# Patient Record
Sex: FEMALE | Race: WHITE | Employment: OTHER | URBAN - NONMETROPOLITAN AREA
[De-identification: names, ages, dates, MRNs, and addresses within clinical notes are randomized per-mention and may not be internally consistent; named-entity substitution may affect disease eponyms.]

---

## 2017-01-09 ENCOUNTER — PATIENT OUTREACH (OUTPATIENT)
Dept: FAMILY MEDICINE CLINIC | Facility: CLINIC | Age: 77
End: 2017-01-09

## 2017-01-10 RX ORDER — METOPROLOL TARTRATE 100 MG/1
TABLET ORAL
Qty: 90 TABLET | Refills: 0 | Status: SHIPPED | OUTPATIENT
Start: 2017-01-10 | End: 2017-02-28

## 2017-01-10 RX ORDER — POTASSIUM CHLORIDE 20 MEQ/1
TABLET, EXTENDED RELEASE ORAL
Qty: 120 TABLET | Refills: 0 | Status: SHIPPED | OUTPATIENT
Start: 2017-01-10 | End: 2017-05-11

## 2017-01-10 NOTE — TELEPHONE ENCOUNTER
Klor con last refilled on 9/20/16 for # 120 with 0 rf. Metoprolol last refilled on 7/13/16 for # 90 with 1 rf. Last labs 12/20/16. Last seen on 12/20/16. /78.   Future Appointments  Date Time Provider Nithin Metzger   3/21/2017 11:00 AM Holden Lozano,

## 2017-02-28 ENCOUNTER — OFFICE VISIT (OUTPATIENT)
Dept: FAMILY MEDICINE CLINIC | Facility: CLINIC | Age: 77
End: 2017-02-28

## 2017-02-28 VITALS
HEART RATE: 86 BPM | TEMPERATURE: 98 F | HEIGHT: 63 IN | SYSTOLIC BLOOD PRESSURE: 146 MMHG | BODY MASS INDEX: 38.8 KG/M2 | WEIGHT: 219 LBS | DIASTOLIC BLOOD PRESSURE: 78 MMHG | OXYGEN SATURATION: 96 %

## 2017-02-28 DIAGNOSIS — E03.9 ACQUIRED HYPOTHYROIDISM: ICD-10-CM

## 2017-02-28 DIAGNOSIS — N39.41 URGE INCONTINENCE OF URINE: ICD-10-CM

## 2017-02-28 DIAGNOSIS — R73.02 GLUCOSE INTOLERANCE (IMPAIRED GLUCOSE TOLERANCE): ICD-10-CM

## 2017-02-28 DIAGNOSIS — I10 ESSENTIAL HYPERTENSION: Primary | ICD-10-CM

## 2017-02-28 LAB
ALBUMIN SERPL-MCNC: 3.6 G/DL (ref 3.5–4.8)
ALP LIVER SERPL-CCNC: 92 U/L (ref 55–142)
ALT SERPL-CCNC: 40 U/L (ref 14–54)
AST SERPL-CCNC: 45 U/L (ref 15–41)
BILIRUB SERPL-MCNC: 0.7 MG/DL (ref 0.1–2)
BUN BLD-MCNC: 33 MG/DL (ref 8–20)
CALCIUM BLD-MCNC: 9.6 MG/DL (ref 8.3–10.3)
CHLORIDE: 104 MMOL/L (ref 101–111)
CO2: 28 MMOL/L (ref 22–32)
CREAT BLD-MCNC: 1.26 MG/DL (ref 0.55–1.02)
EST. AVERAGE GLUCOSE BLD GHB EST-MCNC: 123 MG/DL (ref 68–126)
GLUCOSE BLD-MCNC: 115 MG/DL (ref 70–99)
HBA1C MFR BLD HPLC: 5.9 % (ref ?–5.7)
M PROTEIN MFR SERPL ELPH: 7.5 G/DL (ref 6.1–8.3)
POTASSIUM SERPL-SCNC: 4.3 MMOL/L (ref 3.6–5.1)
SODIUM SERPL-SCNC: 140 MMOL/L (ref 136–144)
TSI SER-ACNC: 9.12 MIU/ML (ref 0.35–5.5)

## 2017-02-28 PROCEDURE — 99214 OFFICE O/P EST MOD 30 MIN: CPT | Performed by: INTERNAL MEDICINE

## 2017-02-28 PROCEDURE — 84443 ASSAY THYROID STIM HORMONE: CPT | Performed by: INTERNAL MEDICINE

## 2017-02-28 PROCEDURE — 83036 HEMOGLOBIN GLYCOSYLATED A1C: CPT | Performed by: INTERNAL MEDICINE

## 2017-02-28 PROCEDURE — 80053 COMPREHEN METABOLIC PANEL: CPT | Performed by: INTERNAL MEDICINE

## 2017-02-28 RX ORDER — METOPROLOL TARTRATE 100 MG/1
100 TABLET ORAL 2 TIMES DAILY
Qty: 180 TABLET | Refills: 0 | Status: SHIPPED | OUTPATIENT
Start: 2017-02-28 | End: 2017-08-14

## 2017-02-28 NOTE — PROGRESS NOTES
Francisco Javier Vega is a 64year old female. HPI:   Pt has been well, she has no falls and she has been caring for her legs and there is no issue now.   She has to take diuretics to keep leg swelling down, but she had trouble with incontinence of urine and sometime unspecified whether generalized or localized, unspecified site    • Hypothyroid       Social History:    Smoking Status: Never Smoker                      Comment: Non-smoker    Alcohol Use: Yes                Comment: <1 drink/day  Patient was not advised

## 2017-03-01 ENCOUNTER — TELEPHONE (OUTPATIENT)
Dept: FAMILY MEDICINE CLINIC | Facility: CLINIC | Age: 77
End: 2017-03-01

## 2017-04-13 ENCOUNTER — TELEPHONE (OUTPATIENT)
Dept: FAMILY MEDICINE CLINIC | Facility: CLINIC | Age: 77
End: 2017-04-13

## 2017-04-15 ENCOUNTER — MED REC SCAN ONLY (OUTPATIENT)
Dept: FAMILY MEDICINE CLINIC | Facility: CLINIC | Age: 77
End: 2017-04-15

## 2017-04-19 ENCOUNTER — OFFICE VISIT (OUTPATIENT)
Dept: FAMILY MEDICINE CLINIC | Facility: CLINIC | Age: 77
End: 2017-04-19

## 2017-04-19 ENCOUNTER — LAB ENCOUNTER (OUTPATIENT)
Dept: LAB | Age: 77
End: 2017-04-19
Attending: INTERNAL MEDICINE
Payer: MEDICARE

## 2017-04-19 VITALS
BODY MASS INDEX: 39.98 KG/M2 | HEIGHT: 61.5 IN | WEIGHT: 214.5 LBS | SYSTOLIC BLOOD PRESSURE: 130 MMHG | RESPIRATION RATE: 16 BRPM | HEART RATE: 68 BPM | DIASTOLIC BLOOD PRESSURE: 70 MMHG | TEMPERATURE: 98 F

## 2017-04-19 DIAGNOSIS — E03.9 ACQUIRED HYPOTHYROIDISM: ICD-10-CM

## 2017-04-19 DIAGNOSIS — N39.3 STRESS INCONTINENCE OF URINE: Primary | ICD-10-CM

## 2017-04-19 DIAGNOSIS — R63.4 WEIGHT LOSS: ICD-10-CM

## 2017-04-19 DIAGNOSIS — R73.02 GLUCOSE INTOLERANCE (IMPAIRED GLUCOSE TOLERANCE): ICD-10-CM

## 2017-04-19 DIAGNOSIS — R35.0 FREQUENCY OF URINATION: ICD-10-CM

## 2017-04-19 DIAGNOSIS — I10 ESSENTIAL HYPERTENSION: ICD-10-CM

## 2017-04-19 PROCEDURE — 36415 COLL VENOUS BLD VENIPUNCTURE: CPT

## 2017-04-19 PROCEDURE — 85025 COMPLETE CBC W/AUTO DIFF WBC: CPT

## 2017-04-19 PROCEDURE — 80053 COMPREHEN METABOLIC PANEL: CPT

## 2017-04-19 PROCEDURE — 84443 ASSAY THYROID STIM HORMONE: CPT

## 2017-04-19 PROCEDURE — 99214 OFFICE O/P EST MOD 30 MIN: CPT | Performed by: INTERNAL MEDICINE

## 2017-04-19 RX ORDER — TOLTERODINE 4 MG/1
4 CAPSULE, EXTENDED RELEASE ORAL DAILY
Qty: 30 CAPSULE | Refills: 0 | Status: SHIPPED | OUTPATIENT
Start: 2017-04-19 | End: 2017-08-14

## 2017-04-19 NOTE — PROGRESS NOTES
Cathy Mccauley is a 76year old female. HPI:   Pt has been having incontinence and using 3 garments a day. She has no urinary retention and would like to try medications again. She has been losing weight, but she says intentionally.      Current Outpatient P exertion  GI: denies abdominal pain and denies heartburn  NEURO: denies headaches    EXAM:   /70 mmHg  Pulse 68  Temp(Src) 98.1 °F (36.7 °C) (Temporal)  Resp 16  Ht 61.5\"  Wt 214 lb 8 oz  BMI 39.88 kg/m2  GENERAL: well developed, well nourished,in n

## 2017-05-11 RX ORDER — POTASSIUM CHLORIDE 20 MEQ/1
TABLET, EXTENDED RELEASE ORAL
Qty: 120 TABLET | Refills: 0 | Status: SHIPPED | OUTPATIENT
Start: 2017-05-11

## 2017-05-26 RX ORDER — LEVOTHYROXINE SODIUM 0.15 MG/1
TABLET ORAL
Qty: 90 TABLET | Refills: 1 | Status: SHIPPED | OUTPATIENT
Start: 2017-05-26

## 2017-06-26 ENCOUNTER — TELEPHONE (OUTPATIENT)
Dept: FAMILY MEDICINE CLINIC | Facility: CLINIC | Age: 77
End: 2017-06-26

## 2017-06-26 NOTE — TELEPHONE ENCOUNTER
Nayely Donahue advised. She said she will be PT only since she is at [facility]. , she was discharged from the hospital today with A-fib and hypomagnesemia, she was admitted with palpitations.

## 2017-06-26 NOTE — TELEPHONE ENCOUNTER
PATIENT IS DISCHARGING TODAY FROM [facility] NEEDS TO VERIFY DR HUDSON IS PAP AND WILL FOLLOWING AND SIGNING Asia Lamb

## 2017-06-27 ENCOUNTER — TELEPHONE (OUTPATIENT)
Dept: FAMILY MEDICINE CLINIC | Facility: CLINIC | Age: 77
End: 2017-06-27

## 2017-06-27 NOTE — TELEPHONE ENCOUNTER
FAXED [facility] PAPERWORK ABOUT MED CHANGES SINCE PT WAS [place], DID WE RECEIVE THIS STUFF?

## 2017-06-28 ENCOUNTER — TELEPHONE (OUTPATIENT)
Dept: FAMILY MEDICINE CLINIC | Facility: CLINIC | Age: 77
End: 2017-06-28

## 2017-06-29 ENCOUNTER — OFFICE VISIT (OUTPATIENT)
Dept: FAMILY MEDICINE CLINIC | Facility: CLINIC | Age: 77
End: 2017-06-29

## 2017-06-29 ENCOUNTER — MED REC SCAN ONLY (OUTPATIENT)
Dept: FAMILY MEDICINE CLINIC | Facility: CLINIC | Age: 77
End: 2017-06-29

## 2017-06-29 VITALS
RESPIRATION RATE: 18 BRPM | DIASTOLIC BLOOD PRESSURE: 70 MMHG | WEIGHT: 207 LBS | BODY MASS INDEX: 38 KG/M2 | SYSTOLIC BLOOD PRESSURE: 118 MMHG | HEART RATE: 102 BPM | TEMPERATURE: 98 F

## 2017-06-29 DIAGNOSIS — N18.30 CKD (CHRONIC KIDNEY DISEASE) STAGE 3, GFR 30-59 ML/MIN (HCC): ICD-10-CM

## 2017-06-29 DIAGNOSIS — M15.8 OTHER OSTEOARTHRITIS INVOLVING MULTIPLE JOINTS: ICD-10-CM

## 2017-06-29 DIAGNOSIS — I50.9 CHF (NYHA CLASS III, ACC/AHA STAGE C) (HCC): ICD-10-CM

## 2017-06-29 DIAGNOSIS — R53.1 GENERALIZED WEAKNESS: ICD-10-CM

## 2017-06-29 DIAGNOSIS — Z00.00 ENCOUNTER FOR ANNUAL HEALTH EXAMINATION: Primary | ICD-10-CM

## 2017-06-29 DIAGNOSIS — I10 ESSENTIAL HYPERTENSION: ICD-10-CM

## 2017-06-29 DIAGNOSIS — R73.02 GLUCOSE INTOLERANCE (IMPAIRED GLUCOSE TOLERANCE): ICD-10-CM

## 2017-06-29 DIAGNOSIS — I48.20 CHRONIC ATRIAL FIBRILLATION (HCC): ICD-10-CM

## 2017-06-29 DIAGNOSIS — E03.9 ACQUIRED HYPOTHYROIDISM: ICD-10-CM

## 2017-06-29 PROCEDURE — G0447 BEHAVIOR COUNSEL OBESITY 15M: HCPCS | Performed by: INTERNAL MEDICINE

## 2017-06-29 PROCEDURE — G0439 PPPS, SUBSEQ VISIT: HCPCS | Performed by: INTERNAL MEDICINE

## 2017-06-29 PROCEDURE — 99214 OFFICE O/P EST MOD 30 MIN: CPT | Performed by: INTERNAL MEDICINE

## 2017-06-29 RX ORDER — CEPHALEXIN 500 MG/1
500 CAPSULE ORAL 4 TIMES DAILY
COMMUNITY
End: 2017-08-14 | Stop reason: ALTCHOICE

## 2017-06-29 RX ORDER — DIGOXIN 125 MCG
TABLET ORAL DAILY
COMMUNITY

## 2017-06-30 ENCOUNTER — TELEPHONE (OUTPATIENT)
Dept: FAMILY MEDICINE CLINIC | Facility: CLINIC | Age: 77
End: 2017-06-30

## 2017-06-30 PROBLEM — I50.9 CHF (NYHA CLASS III, ACC/AHA STAGE C) (HCC): Status: ACTIVE | Noted: 2017-06-30

## 2017-06-30 PROBLEM — I48.20 CHRONIC ATRIAL FIBRILLATION (HCC): Status: ACTIVE | Noted: 2017-06-30

## 2017-06-30 NOTE — PATIENT INSTRUCTIONS
Esperanzapatrizia Sepulveda's SCREENING SCHEDULE   Tests on this list are recommended by your physician but may not be covered, or covered at this frequency, by your insurer. Please check with your insurance carrier before scheduling to verify coverage.    PREVENTATIVE SE Limited to patients who meet one of the following criteria:   • Men who are 73-68 years old and have smoked more than 100 cigarettes in their lifetime   • Anyone with a family history    Colorectal Cancer Screening  Covered up to Age 76     Colonoscopy Scr Influenza  Covered Annually   Orders placed or performed in visit on 10/10/16  -FLU VAC NO PRSV 4 BRADY 3 YRS+   Orders placed or performed in visit on 10/15/15  -FLU VACC PRSV FREE INC ANTIG   Orders placed or performed in visit on 10/16/14  -FLU VACC PRSV information including definitions of the different types of Advance Directives. It also has the State forms available on it's website for anyone to review and print using their home computer and printer. (the forms are also available in [address])  [email or web address]

## 2017-06-30 NOTE — PROGRESS NOTES
HPI:   Agnela Stanley is a 76year old female who presents for a Medicare Subsequent Annual Wellness visit (Pt already had Initial Annual Wellness). Pt was just in the hospital with new onset AFIB and CHF for 4 days at [facility].  She is now on dig and e BEFORE BREAKFAST   KLOR-CON M20 20 MEQ Oral Tab CR TAKE ONE TABLET BY MOUTH TWICE DAILY   Tolterodine Tartrate ER 4 MG Oral Capsule SR 24 Hr Take 1 capsule (4 mg total) by mouth daily.    Metoprolol Tartrate 100 MG Oral Tab Take 1 tablet (100 mg total) by m denies hx of allergy or asthma    EXAM:   /70 (BP Location: Left arm, Patient Position: Sitting, Cuff Size: large)   Pulse 102   Temp 97.8 °F (36.6 °C) (Temporal)   Resp 18   Wt 207 lb   BMI 38.48 kg/m²  Estimated body mass index is 38.48 kg/m² as ca 10/26/2010, 10/17/2011, 10/25/2012, 10/09/2013   • Influenza Vaccine, High Dose, Preserv Free 10/16/2014   • Pneumococcal (Prevnar 13) 06/16/2016   • Pneumovax 23 10/15/2009       ASSESSMENT AND OTHER RELEVANT CHRONIC CONDITIONS:   Pablo Talbot is a 76 year have a Living Will on file in [address] [place] [address] Rd.  The patient has this document but we do not have it in [facility], and patient is instructed to get our office a copy of it for scanning into [address] [place] [address] on file in [facility]:    [facility] does not have months?: 1-Yes    Do you accidently lose urine?: 1-Yes    Do you have difficulty seeing?: 0-No    Do you have any difficulty walking or getting up?: 1-Yes    Do you have any tripping hazards?: 0-No    Are you on multiple medications?: 1-Yes    Does pain af Colonoscopy,10 Years due on 03/26/1988 Update [facility] if applicable    Flex Sigmoidoscopy Screen every 10 years No results found for this or any previous visit. No flowsheet data found.      Fecal Occult Blood Annually No results found for: FOB N Medicare Part B No vaccine history found This may be covered with your pharmacy  prescription benefits        SPECIFIC DISEASE MONITORING Internal Lab or Procedure External Lab or Procedure   Annual Monitoring of Persistent     Medications (ACE/ARB, digoxi choice of behavior and assessed behavioral health risk of obesity. Advise: We discussed clear and specific personalized plan for behavioral change including discussion about personal harm from her obesity and benefits of her losing weight. Agree:  Thr

## 2017-07-05 ENCOUNTER — TELEPHONE (OUTPATIENT)
Dept: FAMILY MEDICINE CLINIC | Facility: CLINIC | Age: 77
End: 2017-07-05

## 2017-07-05 NOTE — TELEPHONE ENCOUNTER
Patient said last Saturday she had a BM and passed a lot of blood, she said the same thing happened Sunday so she went to [facility] and they transferred her to [facility] Sunday and they did an EGD and colonoscopy.  She said the results showed hemorrho

## 2017-07-05 NOTE — TELEPHONE ENCOUNTER
Patient advised. She said she is seeing the cardiologist in August but she does have a call into their office.

## 2017-07-05 NOTE — TELEPHONE ENCOUNTER
FAXED RECORD REQUEST TO [address] [place] [address] ON [facility]   6/29

## 2017-07-05 NOTE — TELEPHONE ENCOUNTER
Get records she needs to take some form of anticoagulant for AFIB.  She can discuss with her cardiologist.

## 2017-07-17 ENCOUNTER — OFFICE VISIT (OUTPATIENT)
Dept: FAMILY MEDICINE CLINIC | Facility: CLINIC | Age: 77
End: 2017-07-17

## 2017-07-17 VITALS
TEMPERATURE: 99 F | DIASTOLIC BLOOD PRESSURE: 80 MMHG | HEART RATE: 86 BPM | WEIGHT: 200 LBS | BODY MASS INDEX: 37.28 KG/M2 | HEIGHT: 61.5 IN | SYSTOLIC BLOOD PRESSURE: 128 MMHG | RESPIRATION RATE: 18 BRPM

## 2017-07-17 DIAGNOSIS — R73.02 GLUCOSE INTOLERANCE (IMPAIRED GLUCOSE TOLERANCE): ICD-10-CM

## 2017-07-17 DIAGNOSIS — K62.5 RECTAL BLEEDING: Primary | ICD-10-CM

## 2017-07-17 DIAGNOSIS — I48.20 CHRONIC ATRIAL FIBRILLATION (HCC): ICD-10-CM

## 2017-07-17 PROCEDURE — 99214 OFFICE O/P EST MOD 30 MIN: CPT | Performed by: INTERNAL MEDICINE

## 2017-07-17 RX ORDER — FUROSEMIDE 40 MG/1
40 TABLET ORAL 2 TIMES DAILY
Qty: 720 TABLET | Refills: 3 | Status: SHIPPED | OUTPATIENT
Start: 2017-07-17 | End: 2018-07-12

## 2017-07-17 NOTE — PROGRESS NOTES
Shaina Red is a 76year old female. HPI:   Pt had a rectal bleed Sunday 7/2-7/4/2017. She had EGD and colonoscopy. She was on Eliquis for new onset AFIB and an 81 mg ASA. She has a follow up with cardio the [facility] 7/19. I will get records.  I reviewed re reflux    • Hypothyroid    • Obesity, unspecified    • Osteoarthrosis, unspecified whether generalized or localized, unspecified site    • Unspecified essential hypertension       Social History:  Smoking status: Never Smoker

## 2017-07-21 ENCOUNTER — TELEPHONE (OUTPATIENT)
Dept: FAMILY MEDICINE CLINIC | Facility: CLINIC | Age: 77
End: 2017-07-21

## 2017-07-21 NOTE — TELEPHONE ENCOUNTER
Needs to verify medication for Levothyroxine. Dosage does not match what she has there and hospital discharge papers.

## 2017-07-21 NOTE — TELEPHONE ENCOUNTER
Jessica Valdes states that the hospital discharge med list did not have the levothyroxine listed and patti wanted to update pt's list. Kd Ritter that per current med list, pt is taking 150mcg daily.  Jessica Valdes verbalized understanding of the information provided

## 2017-08-14 ENCOUNTER — MED REC SCAN ONLY (OUTPATIENT)
Dept: FAMILY MEDICINE CLINIC | Facility: CLINIC | Age: 77
End: 2017-08-14

## 2017-08-14 ENCOUNTER — OFFICE VISIT (OUTPATIENT)
Dept: FAMILY MEDICINE CLINIC | Facility: CLINIC | Age: 77
End: 2017-08-14

## 2017-08-14 VITALS
BODY MASS INDEX: 37 KG/M2 | WEIGHT: 197 LBS | DIASTOLIC BLOOD PRESSURE: 74 MMHG | SYSTOLIC BLOOD PRESSURE: 126 MMHG | HEART RATE: 86 BPM | TEMPERATURE: 98 F | RESPIRATION RATE: 16 BRPM

## 2017-08-14 DIAGNOSIS — Z12.39 SCREENING FOR BREAST CANCER: ICD-10-CM

## 2017-08-14 DIAGNOSIS — N95.1 MENOPAUSAL STATE: ICD-10-CM

## 2017-08-14 DIAGNOSIS — R73.9 HYPERGLYCEMIA: ICD-10-CM

## 2017-08-14 DIAGNOSIS — N39.41 URGE INCONTINENCE OF URINE: ICD-10-CM

## 2017-08-14 DIAGNOSIS — M81.0 AGE-RELATED OSTEOPOROSIS WITHOUT CURRENT PATHOLOGICAL FRACTURE: ICD-10-CM

## 2017-08-14 DIAGNOSIS — I48.19 PERSISTENT ATRIAL FIBRILLATION (HCC): Primary | ICD-10-CM

## 2017-08-14 LAB
BILIRUBIN: NEGATIVE
CREAT UR-SCNC: 105 MG/DL
GLUCOSE (URINE DIPSTICK): NEGATIVE MG/DL
KETONES (URINE DIPSTICK): NEGATIVE MG/DL
MICROALBUMIN UR-MCNC: 15.5 MG/DL
MICROALBUMIN/CREAT 24H UR-RTO: 147.6 UG/MG (ref ?–30)
NITRITE, URINE: NEGATIVE
PH, URINE: 5.5 (ref 4.5–8)
PROTEIN (URINE DIPSTICK): 30 MG/DL
SPECIFIC GRAVITY: 1.02 (ref 1–1.03)
URINE-COLOR: YELLOW
UROBILINOGEN,SEMI-QN: 0.2 MG/DL (ref 0–1.9)

## 2017-08-14 PROCEDURE — 87186 SC STD MICRODIL/AGAR DIL: CPT | Performed by: INTERNAL MEDICINE

## 2017-08-14 PROCEDURE — 87086 URINE CULTURE/COLONY COUNT: CPT | Performed by: INTERNAL MEDICINE

## 2017-08-14 PROCEDURE — 82043 UR ALBUMIN QUANTITATIVE: CPT | Performed by: INTERNAL MEDICINE

## 2017-08-14 PROCEDURE — 87077 CULTURE AEROBIC IDENTIFY: CPT | Performed by: INTERNAL MEDICINE

## 2017-08-14 PROCEDURE — 82570 ASSAY OF URINE CREATININE: CPT | Performed by: INTERNAL MEDICINE

## 2017-08-14 PROCEDURE — 99214 OFFICE O/P EST MOD 30 MIN: CPT | Performed by: INTERNAL MEDICINE

## 2017-08-14 RX ORDER — METOPROLOL TARTRATE 100 MG/1
100 TABLET ORAL 2 TIMES DAILY
Qty: 180 TABLET | Refills: 0 | Status: SHIPPED | OUTPATIENT
Start: 2017-08-14

## 2017-08-14 NOTE — PROGRESS NOTES
Received records from [facility]- patients upper GI endoscopy. HM updated. And reports sent to scanning.

## 2017-08-15 NOTE — PROGRESS NOTES
Pablo Talbot is a 76year old female. HPI:   Pt has been doing ok. She continues to loose weight. Her rate is controlled and she has just been on an ASA.      Current Outpatient Prescriptions:  Probiotic Product (PROBIOTIC ADVANCED OR) Take 1 tablet by jorge headaches    EXAM:   /74   Pulse 86   Temp 98 °F (36.7 °C) (Temporal)   Resp 16   Wt 197 lb   BMI 36.62 kg/m²   GENERAL: well developed, well nourished,in no apparent distress  SKIN: no rashes,no suspicious lesions  HEENT: atraumatic, normocephalic,e

## 2017-08-16 ENCOUNTER — TELEPHONE (OUTPATIENT)
Dept: FAMILY MEDICINE CLINIC | Facility: CLINIC | Age: 77
End: 2017-08-16

## 2017-08-16 RX ORDER — SULFAMETHOXAZOLE AND TRIMETHOPRIM 800; 160 MG/1; MG/1
1 TABLET ORAL 2 TIMES DAILY
Qty: 28 TABLET | Refills: 0 | Status: SHIPPED | OUTPATIENT
Start: 2017-08-16

## 2017-08-16 NOTE — TELEPHONE ENCOUNTER
Patient advised, she asked if Dr Velasquez Singh got the results of the Colonoscopy she had done 1 month ago at [facility], she did sign a release

## 2017-08-28 ENCOUNTER — OFFICE VISIT (OUTPATIENT)
Dept: FAMILY MEDICINE CLINIC | Facility: CLINIC | Age: 77
End: 2017-08-28

## 2017-08-28 VITALS
BODY MASS INDEX: 34.67 KG/M2 | WEIGHT: 186 LBS | SYSTOLIC BLOOD PRESSURE: 126 MMHG | TEMPERATURE: 99 F | DIASTOLIC BLOOD PRESSURE: 80 MMHG | RESPIRATION RATE: 18 BRPM | HEART RATE: 78 BPM | HEIGHT: 61.5 IN

## 2017-08-28 DIAGNOSIS — N18.30 CKD (CHRONIC KIDNEY DISEASE) STAGE 3, GFR 30-59 ML/MIN (HCC): Primary | ICD-10-CM

## 2017-08-28 DIAGNOSIS — K59.1 FUNCTIONAL DIARRHEA: ICD-10-CM

## 2017-08-28 DIAGNOSIS — R73.02 GLUCOSE INTOLERANCE (IMPAIRED GLUCOSE TOLERANCE): ICD-10-CM

## 2017-08-28 PROCEDURE — 99214 OFFICE O/P EST MOD 30 MIN: CPT | Performed by: INTERNAL MEDICINE

## 2017-08-28 NOTE — PROGRESS NOTES
Larry Ferrari is a 76year old female. HPI:   Pt has been having more fatigue and diarrhea for a week; continues to loose weight and no appetite. Having accidents, stool and urine. Lives in assisted living, but it is getting harder to care for herself.      C well otherwise  SKIN: denies any unusual skin lesions or rashes  RESPIRATORY: denies shortness of breath with exertion  CARDIOVASCULAR: denies chest pain on exertion  GI: denies abdominal pain and denies heartburn  NEURO: denies headaches    EXAM:

## 2017-08-30 ENCOUNTER — TELEPHONE (OUTPATIENT)
Dept: FAMILY MEDICINE CLINIC | Facility: CLINIC | Age: 77
End: 2017-08-30

## 2017-08-30 ENCOUNTER — LAB ENCOUNTER (OUTPATIENT)
Dept: LAB | Age: 77
End: 2017-08-30
Attending: INTERNAL MEDICINE
Payer: MEDICARE

## 2017-08-30 DIAGNOSIS — R19.7 DIARRHEA, UNSPECIFIED TYPE: ICD-10-CM

## 2017-08-30 DIAGNOSIS — E03.9 ACQUIRED HYPOTHYROIDISM: Primary | ICD-10-CM

## 2017-08-30 DIAGNOSIS — Z79.899 LONG-TERM USE OF HIGH-RISK MEDICATION: ICD-10-CM

## 2017-08-30 DIAGNOSIS — I10 ESSENTIAL HYPERTENSION: ICD-10-CM

## 2017-08-30 DIAGNOSIS — K59.1 FUNCTIONAL DIARRHEA: ICD-10-CM

## 2017-08-30 PROCEDURE — 87427 SHIGA-LIKE TOXIN AG IA: CPT

## 2017-08-30 PROCEDURE — 87046 STOOL CULTR AEROBIC BACT EA: CPT

## 2017-08-30 PROCEDURE — 87045 FECES CULTURE AEROBIC BACT: CPT

## 2017-09-08 ENCOUNTER — TELEPHONE (OUTPATIENT)
Dept: FAMILY MEDICINE CLINIC | Facility: CLINIC | Age: 77
End: 2017-09-08

## 2017-09-08 NOTE — TELEPHONE ENCOUNTER
Informed Robbi Naikovan that she is not listed on the HIPPA form , at this time we are unable to release any information to her. She can fax request signed by pt if records are needed. She verbalized understanding.

## 2017-09-11 ENCOUNTER — TELEPHONE (OUTPATIENT)
Dept: FAMILY MEDICINE CLINIC | Facility: CLINIC | Age: 77
End: 2017-09-11

## 2017-09-11 NOTE — TELEPHONE ENCOUNTER
WHO DOES DR Prabha Alejandro RECOMMEND FOR SHORT TERM REHAB? DR Steve Romero, OR MELO?  JUST NEED TO KNOW WHICH DR SHE WOULD CHOOSE TO OVERSEE PT

## 2017-09-11 NOTE — TELEPHONE ENCOUNTER
Kim Quintero said patient was discharged from the hospital with leukocytosis ,colitis, samll bowel obstruction and open wounds. They are going to have her see a wound care doctor and they are going to check for MRSA and c-dif.  The three doctors below are the ones

## 2017-10-14 ENCOUNTER — CHARTING TRANS (OUTPATIENT)
Dept: OTHER | Age: 77
End: 2017-10-14

## 2018-02-14 ENCOUNTER — PATIENT OUTREACH (OUTPATIENT)
Dept: FAMILY MEDICINE CLINIC | Facility: CLINIC | Age: 78
End: 2018-02-14

## 2018-06-19 ENCOUNTER — MED REC SCAN ONLY (OUTPATIENT)
Dept: FAMILY MEDICINE CLINIC | Facility: CLINIC | Age: 78
End: 2018-06-19

## 2018-11-23 ENCOUNTER — IMAGING SERVICES (OUTPATIENT)
Dept: OTHER | Age: 78
End: 2018-11-23

## 2019-01-24 ENCOUNTER — IMAGING SERVICES (OUTPATIENT)
Dept: OTHER | Age: 79
End: 2019-01-24